# Patient Record
Sex: MALE | Race: BLACK OR AFRICAN AMERICAN | Employment: FULL TIME | URBAN - METROPOLITAN AREA
[De-identification: names, ages, dates, MRNs, and addresses within clinical notes are randomized per-mention and may not be internally consistent; named-entity substitution may affect disease eponyms.]

---

## 2022-01-18 ENCOUNTER — HOSPITAL ENCOUNTER (EMERGENCY)
Age: 32
Discharge: HOME | End: 2022-01-18
Attending: EMERGENCY MEDICINE

## 2022-01-18 ENCOUNTER — APPOINTMENT (OUTPATIENT)
Dept: CT IMAGING | Age: 32
End: 2022-01-18

## 2022-01-18 ENCOUNTER — APPOINTMENT (OUTPATIENT)
Dept: GENERAL RADIOLOGY | Age: 32
End: 2022-01-18

## 2022-01-18 VITALS
OXYGEN SATURATION: 97 % | HEIGHT: 72 IN | SYSTOLIC BLOOD PRESSURE: 136 MMHG | RESPIRATION RATE: 16 BRPM | DIASTOLIC BLOOD PRESSURE: 64 MMHG | BODY MASS INDEX: 20.86 KG/M2 | HEART RATE: 76 BPM | TEMPERATURE: 98.1 F | WEIGHT: 154 LBS

## 2022-01-18 DIAGNOSIS — U07.1 COVID-19 VIRUS INFECTION: Primary | ICD-10-CM

## 2022-01-18 LAB
A/G RATIO: 1.4 (ref 1.1–2.2)
ALBUMIN SERPL-MCNC: 4.1 G/DL (ref 3.4–5)
ALP BLD-CCNC: 64 U/L (ref 40–129)
ALT SERPL-CCNC: 12 U/L (ref 10–40)
ANION GAP SERPL CALCULATED.3IONS-SCNC: 10 MMOL/L (ref 3–16)
AST SERPL-CCNC: 22 U/L (ref 15–37)
BASOPHILS ABSOLUTE: 0 K/UL (ref 0–0.2)
BASOPHILS RELATIVE PERCENT: 0.2 %
BILIRUB SERPL-MCNC: 0.3 MG/DL (ref 0–1)
BUN BLDV-MCNC: 13 MG/DL (ref 7–20)
CALCIUM SERPL-MCNC: 8.4 MG/DL (ref 8.3–10.6)
CHLORIDE BLD-SCNC: 99 MMOL/L (ref 99–110)
CO2: 27 MMOL/L (ref 21–32)
CREAT SERPL-MCNC: 0.8 MG/DL (ref 0.9–1.3)
EOSINOPHILS ABSOLUTE: 0 K/UL (ref 0–0.6)
EOSINOPHILS RELATIVE PERCENT: 0.3 %
GFR AFRICAN AMERICAN: >60
GFR NON-AFRICAN AMERICAN: >60
GLUCOSE BLD-MCNC: 55 MG/DL (ref 70–99)
HCT VFR BLD CALC: 41.8 % (ref 40.5–52.5)
HEMOGLOBIN: 14.3 G/DL (ref 13.5–17.5)
LYMPHOCYTES ABSOLUTE: 1.9 K/UL (ref 1–5.1)
LYMPHOCYTES RELATIVE PERCENT: 33.3 %
MCH RBC QN AUTO: 29.5 PG (ref 26–34)
MCHC RBC AUTO-ENTMCNC: 34.3 G/DL (ref 31–36)
MCV RBC AUTO: 86.1 FL (ref 80–100)
MONOCYTES ABSOLUTE: 0.8 K/UL (ref 0–1.3)
MONOCYTES RELATIVE PERCENT: 13.1 %
NEUTROPHILS ABSOLUTE: 3.1 K/UL (ref 1.7–7.7)
NEUTROPHILS RELATIVE PERCENT: 53.1 %
PDW BLD-RTO: 13.1 % (ref 12.4–15.4)
PLATELET # BLD: 202 K/UL (ref 135–450)
PMV BLD AUTO: 7.7 FL (ref 5–10.5)
POTASSIUM REFLEX MAGNESIUM: 4.4 MMOL/L (ref 3.5–5.1)
RAPID INFLUENZA  B AGN: NEGATIVE
RAPID INFLUENZA A AGN: NEGATIVE
RBC # BLD: 4.86 M/UL (ref 4.2–5.9)
SARS-COV-2, NAAT: DETECTED
SODIUM BLD-SCNC: 136 MMOL/L (ref 136–145)
TOTAL PROTEIN: 7 G/DL (ref 6.4–8.2)
WBC # BLD: 5.8 K/UL (ref 4–11)

## 2022-01-18 PROCEDURE — 6360000004 HC RX CONTRAST MEDICATION: Performed by: EMERGENCY MEDICINE

## 2022-01-18 PROCEDURE — 87804 INFLUENZA ASSAY W/OPTIC: CPT

## 2022-01-18 PROCEDURE — 99284 EMERGENCY DEPT VISIT MOD MDM: CPT

## 2022-01-18 PROCEDURE — 87635 SARS-COV-2 COVID-19 AMP PRB: CPT

## 2022-01-18 PROCEDURE — 85025 COMPLETE CBC W/AUTO DIFF WBC: CPT

## 2022-01-18 PROCEDURE — 71260 CT THORAX DX C+: CPT

## 2022-01-18 PROCEDURE — 80053 COMPREHEN METABOLIC PANEL: CPT

## 2022-01-18 PROCEDURE — 71045 X-RAY EXAM CHEST 1 VIEW: CPT

## 2022-01-18 PROCEDURE — 6370000000 HC RX 637 (ALT 250 FOR IP): Performed by: EMERGENCY MEDICINE

## 2022-01-18 RX ORDER — ONDANSETRON 4 MG/1
4 TABLET, ORALLY DISINTEGRATING ORAL ONCE
Status: COMPLETED | OUTPATIENT
Start: 2022-01-18 | End: 2022-01-18

## 2022-01-18 RX ORDER — IBUPROFEN 600 MG/1
600 TABLET ORAL ONCE
Status: COMPLETED | OUTPATIENT
Start: 2022-01-18 | End: 2022-01-18

## 2022-01-18 RX ADMIN — IBUPROFEN 600 MG: 600 TABLET ORAL at 19:11

## 2022-01-18 RX ADMIN — IOPAMIDOL 75 ML: 755 INJECTION, SOLUTION INTRAVENOUS at 20:19

## 2022-01-18 RX ADMIN — ONDANSETRON 4 MG: 4 TABLET, ORALLY DISINTEGRATING ORAL at 19:11

## 2022-01-18 ASSESSMENT — PAIN SCALES - GENERAL: PAINLEVEL_OUTOF10: 0

## 2022-01-19 NOTE — ED PROVIDER NOTES
[facility]  ED  [address]  [address] [facility] [address] [phone]    As needed    [address] [facility]  [phone]  Call in 1 day        Discharge Medications:  New Prescriptions    No medications on file       FINAL IMPRESSION  1. COVID-19 virus infection        Blood pressure 136/64, pulse 76, temperature 98.1 °F (36.7 °C), temperature source Oral, resp. rate 16, height 6' (1.829 m), weight 154 lb (69.9 kg), SpO2 97 %.          Jose F Hua MD  01/18/22 6976

## 2022-01-19 NOTE — ED PROVIDER NOTES
[address] [facility]  ED  EMERGENCY DEPARTMENT ENCOUNTER      Pt Name: Rick Grossman  MRN: [id number]  Sydneygfcalvin 1990  Date of evaluation: 1/18/2022  Provider: Sandoval Cormier MD    CHIEF COMPLAINT       Chief Complaint   Patient presents with    Generalized Body Aches     hasnt felt well all weekend. reports flu like symptoms over the weekend. states he's had body aches, chills, coughing, and chest congestion     Cough    Chest Congestion         HISTORY OF PRESENT ILLNESS   (Location/Symptom, Timing/Onset, Context/Setting, Quality, Duration, Modifying Factors, Severity)  Note limiting factors. Rick Grossman is a 32 y.o. male with past medical history of no significant illness here today with cough congestion body aches. Patient states that for the last 3 to 4 days he has felt unwell. States he felt like he likely had the flu. He noted some runny nose, nasal congestion mild sore throat and nonproductive cough. Has had generalized myalgias but no known fevers. He has had slight nausea with occasional emesis and some loose stools though no overt diarrhea. No dysuria or hematuria. No known COVID-19 exposure. He states he was sleeping throughout the majority the weekend to rest but still felt unwell and thought he would come in to get evaluated. Denies hemoptysis. No lower extremity swelling, redness or pain. [facility]    Nursing Notes were reviewed. REVIEW OF SYSTEMS    (2-9 systems for level 4, 10 or more for level 5)     Review of Systems    Please see HPI for pertinent positive and negative review of system findings. A full 10 system ROS was performed and otherwise negative. PAST MEDICAL HISTORY   History reviewed. No pertinent past medical history. SURGICAL HISTORY     History reviewed. No pertinent surgical history. CURRENT MEDICATIONS       Previous Medications    No medications on file       ALLERGIES     Patient has no known allergies.     FAMILY HISTORY     History reviewed. No pertinent family history. SOCIAL HISTORY       Social History     Socioeconomic History    Marital status: Single     Spouse name: None    Number of children: None    Years of education: None    Highest education level: None   Occupational History    None   Tobacco Use    Smoking status: Current Every Day Smoker     Packs/day: 0.75     Types: Cigarettes    Smokeless tobacco: Never Used   Substance and Sexual Activity    Alcohol use: Yes     Comment: not frequently     Drug use: No    Sexual activity: None   Other Topics Concern    None   Social History Narrative    None     Social Determinants of Health     Financial Resource Strain:     Difficulty of Paying Living Expenses: Not on file   Food Insecurity:     Worried About Running Out of Food in the Last Year: Not on file    Basilio of Food in the Last Year: Not on file   Transportation Needs:     Lack of Transportation (Medical): Not on file    Lack of Transportation (Non-Medical):  Not on file   Physical Activity:     Days of Exercise per Week: Not on file    Minutes of Exercise per Session: Not on file   Stress:     Feeling of Stress : Not on file   Social Connections:     Frequency of Communication with Friends and Family: Not on file    Frequency of Social Gatherings with Friends and Family: Not on file    Attends Catholic Services: Not on file    Active Member of [address] [place] [address] or Organizations: Not on file    Attends Club or Organization Meetings: Not on file    Marital Status: Not on file   Intimate Partner Violence:     Fear of Current or Ex-Partner: Not on file    Emotionally Abused: Not on file    Physically Abused: Not on file    Sexually Abused: Not on file   Housing Stability:     Unable to Pay for Housing in the Last Year: Not on file    Number of [place] in the Last Year: Not on file    Unstable Housing in the Last Year: Not on file       SCREENINGS               PHYSICAL EXAM    (up to 7 for level 4, 8 or more for level 5)     ED Triage Vitals [01/18/22 1801]   BP Temp Temp Source Pulse Resp SpO2 Height Weight   127/72 98.1 °F (36.7 °C) Oral 79 17 99 % 6' (1.829 m) 154 lb (69.9 kg)       Physical Exam    General appearance:  Cooperative. No acute distress. Skin:  Warm. Dry. Eye:  Extraocular movements intact. Ears, nose, mouth and throat:  Oral mucosa moist,  Neck:  Trachea midline. Heart:  Regular rate and rhythm  Perfusion:  intact  Respiratory:  Lungs clear to auscultation bilaterally. Respirations nonlabored. Abdominal:   Non distended. Nontender  Neurological:  Alert and oriented x 3.   Moves all extremities spontaneously  Musculoskeletal:   Normal ROM, no deformities          Psychiatric:  Normal mood      DIAGNOSTIC RESULTS       Labs Reviewed   COVID-19, RAPID - Abnormal; Notable for the following components:       Result Value    SARS-CoV-2, NAAT DETECTED (*)     All other components within normal limits    Narrative:     Benja Jennifer tel. [phone],  Microbiology results called to and read back by CAROLINA BUNCH RN, 01/18/2022  19:51, by [facility]  Performed at:  [place] [address] [address]   Phone [phone]   COMPREHENSIVE METABOLIC PANEL W/ REFLEX TO MG FOR LOW K - Abnormal; Notable for the following components:    Glucose 55 (*)     CREATININE 0.8 (*)     All other components within normal limits    Narrative:     Performed at:  [place] [address] [address]   Phone [phone]   RAPID INFLUENZA A/B ANTIGENS    Narrative:     Performed at:  [place] [address] [address]   Phone [phone]   CBC WITH AUTO DIFFERENTIAL    Narrative:     Performed at:  [address], [facility] [address]   Phone [phone]       Interpretation per the Radiologist below, if obtained/available at the time of this note:    XR CHEST PORTABLE   Final Result   Soft tissue density in the right medial hemithorax, possibly a dilated   esophagus. Recommend CT follow-up. Otherwise unremarkable chest.  No acute pulmonary infiltrate. CT CHEST PULMONARY EMBOLISM W CONTRAST    (Results Pending)       All other labs/imaging were within normal range or not returned as of this dictation. EMERGENCY DEPARTMENT COURSE and DIFFERENTIAL DIAGNOSIS/MDM:   Vitals:    Vitals:    01/18/22 1801   BP: 127/72   Pulse: 79   Resp: 17   Temp: 98.1 °F (36.7 °C)   TempSrc: Oral   SpO2: 99%   Weight: 154 lb (69.9 kg)   Height: 6' (1.829 m)       Well-appearing male here today with a constellation of symptoms likely consistent with a viral illness. Given current pandemic suspect likely COVID-19 and t testing ultimately did confirm this. Influenza also tested and negative. Given his cough chest x-ray performed and was concerning for possible soft tissue mass. Radiology recommended CT scan which is pending at this time and will be signed out to the oncoming physician for review. Please see their note for any further follow-up recommendations or treatment. MDM    CONSULTS     None    Critical Care:   None    REASSESSMENT          PROCEDURE     Unless otherwise noted below, none     Procedures      FINAL IMPRESSION      1. COVID-19 virus infection            DISPOSITION/PLAN   DISPOSITION          PATIENT REFERRED TO:  [facility]  ED  [address] [facility] [address] [phone]    As needed      DISCHARGE MEDICATIONS:  New Prescriptions    No medications on file     Controlled Substances Monitoring:     No flowsheet data found.     (Please note that portions of this note were completed with a voice recognition program.  Efforts were made to edit the dictations but occasionally words are mis-transcribed.)    Manisha Angela MD (electronically signed)  Attending Emergency Physician            Yury Blunt MD  01/18/22 [address]  01/18/22 2620

## 2022-04-24 ENCOUNTER — HOSPITAL ENCOUNTER (EMERGENCY)
Age: 32
Discharge: HOME | End: 2022-04-24
Attending: EMERGENCY MEDICINE

## 2022-04-24 VITALS
SYSTOLIC BLOOD PRESSURE: 120 MMHG | HEART RATE: 98 BPM | RESPIRATION RATE: 16 BRPM | BODY MASS INDEX: 20.86 KG/M2 | HEIGHT: 72 IN | DIASTOLIC BLOOD PRESSURE: 80 MMHG | OXYGEN SATURATION: 98 % | TEMPERATURE: 98.6 F | WEIGHT: 154 LBS

## 2022-04-24 DIAGNOSIS — T71.162A SUICIDE ATTEMPT BY HANGING, INITIAL ENCOUNTER (HCC): Primary | ICD-10-CM

## 2022-04-24 DIAGNOSIS — F10.920 ACUTE ALCOHOLIC INTOXICATION WITHOUT COMPLICATION (HCC): ICD-10-CM

## 2022-04-24 LAB
A/G RATIO: 1.6 (ref 1.1–2.2)
ACETAMINOPHEN LEVEL: <5 UG/ML (ref 10–30)
ALBUMIN SERPL-MCNC: 4.6 G/DL (ref 3.4–5)
ALP BLD-CCNC: 84 U/L (ref 40–129)
ALT SERPL-CCNC: 9 U/L (ref 10–40)
AMPHETAMINE SCREEN, URINE: NORMAL
ANION GAP SERPL CALCULATED.3IONS-SCNC: 14 MMOL/L (ref 3–16)
AST SERPL-CCNC: 16 U/L (ref 15–37)
BARBITURATE SCREEN URINE: NORMAL
BASOPHILS ABSOLUTE: 0.1 K/UL (ref 0–0.2)
BASOPHILS RELATIVE PERCENT: 0.6 %
BENZODIAZEPINE SCREEN, URINE: NORMAL
BILIRUB SERPL-MCNC: 0.3 MG/DL (ref 0–1)
BUN BLDV-MCNC: 8 MG/DL (ref 7–20)
CALCIUM SERPL-MCNC: 9.3 MG/DL (ref 8.3–10.6)
CANNABINOID SCREEN URINE: NORMAL
CHLORIDE BLD-SCNC: 102 MMOL/L (ref 99–110)
CO2: 23 MMOL/L (ref 21–32)
COCAINE METABOLITE SCREEN URINE: NORMAL
CREAT SERPL-MCNC: 1 MG/DL (ref 0.9–1.3)
EOSINOPHILS ABSOLUTE: 0.1 K/UL (ref 0–0.6)
EOSINOPHILS RELATIVE PERCENT: 1.5 %
ETHANOL: 54 MG/DL (ref 0–0.08)
GFR AFRICAN AMERICAN: >60
GFR NON-AFRICAN AMERICAN: >60
GLUCOSE BLD-MCNC: 85 MG/DL (ref 70–99)
HCT VFR BLD CALC: 43.7 % (ref 40.5–52.5)
HEMOGLOBIN: 15.1 G/DL (ref 13.5–17.5)
INFLUENZA A: NOT DETECTED
INFLUENZA B: NOT DETECTED
LYMPHOCYTES ABSOLUTE: 2.7 K/UL (ref 1–5.1)
LYMPHOCYTES RELATIVE PERCENT: 28.1 %
Lab: NORMAL
MCH RBC QN AUTO: 29.8 PG (ref 26–34)
MCHC RBC AUTO-ENTMCNC: 34.6 G/DL (ref 31–36)
MCV RBC AUTO: 86.2 FL (ref 80–100)
METHADONE SCREEN, URINE: NORMAL
MONOCYTES ABSOLUTE: 0.9 K/UL (ref 0–1.3)
MONOCYTES RELATIVE PERCENT: 9.1 %
NEUTROPHILS ABSOLUTE: 5.8 K/UL (ref 1.7–7.7)
NEUTROPHILS RELATIVE PERCENT: 60.7 %
OPIATE SCREEN URINE: NORMAL
OXYCODONE URINE: NORMAL
PDW BLD-RTO: 13.3 % (ref 12.4–15.4)
PH UA: 7
PHENCYCLIDINE SCREEN URINE: NORMAL
PLATELET # BLD: 289 K/UL (ref 135–450)
PMV BLD AUTO: 7.3 FL (ref 5–10.5)
POTASSIUM REFLEX MAGNESIUM: 3.9 MMOL/L (ref 3.5–5.1)
PROPOXYPHENE SCREEN: NORMAL
RBC # BLD: 5.07 M/UL (ref 4.2–5.9)
SALICYLATE, SERUM: <0.3 MG/DL (ref 15–30)
SARS-COV-2 RNA, RT PCR: NOT DETECTED
SODIUM BLD-SCNC: 139 MMOL/L (ref 136–145)
TOTAL PROTEIN: 7.5 G/DL (ref 6.4–8.2)
WBC # BLD: 9.6 K/UL (ref 4–11)

## 2022-04-24 PROCEDURE — 99283 EMERGENCY DEPT VISIT LOW MDM: CPT

## 2022-04-24 PROCEDURE — 87636 SARSCOV2 & INF A&B AMP PRB: CPT

## 2022-04-24 PROCEDURE — 80143 DRUG ASSAY ACETAMINOPHEN: CPT

## 2022-04-24 PROCEDURE — 82077 ASSAY SPEC XCP UR&BREATH IA: CPT

## 2022-04-24 PROCEDURE — 36415 COLL VENOUS BLD VENIPUNCTURE: CPT

## 2022-04-24 PROCEDURE — 85025 COMPLETE CBC W/AUTO DIFF WBC: CPT

## 2022-04-24 PROCEDURE — 80307 DRUG TEST PRSMV CHEM ANLYZR: CPT

## 2022-04-24 PROCEDURE — 80053 COMPREHEN METABOLIC PANEL: CPT

## 2022-04-24 PROCEDURE — 80179 DRUG ASSAY SALICYLATE: CPT

## 2022-04-24 RX ORDER — 0.9 % SODIUM CHLORIDE 0.9 %
1000 INTRAVENOUS SOLUTION INTRAVENOUS ONCE
Status: DISCONTINUED | OUTPATIENT
Start: 2022-04-24 | End: 2022-04-24 | Stop reason: HOSPADM

## 2022-04-24 ASSESSMENT — PATIENT HEALTH QUESTIONNAIRE - PHQ9: SUM OF ALL RESPONSES TO PHQ QUESTIONS 1-9: 1

## 2022-04-24 ASSESSMENT — PAIN - FUNCTIONAL ASSESSMENT: PAIN_FUNCTIONAL_ASSESSMENT: NONE - DENIES PAIN

## 2022-04-24 ASSESSMENT — LIFESTYLE VARIABLES: HOW OFTEN DO YOU HAVE A DRINK CONTAINING ALCOHOL: NEVER

## 2022-04-24 NOTE — DISCHARGE INSTR - COC
Continuity of Care Form    Patient Name: Reji Cr   :  1990  MRN:  [id number]    Admit date:  2022  Discharge date:  ***    Code Status Order: No Order   Advance Directives:      Admitting Physician:  No admitting provider for patient encounter. PCP: No primary care provider on file. Discharging Nurse: [facility] Unit/Room#: B4/B4  Discharging Unit Phone Number: ***    Emergency Contact:   Extended Emergency Contact Information  Primary Emergency Contact: Ramón Marin, [address] Phone: [phone]  Mobile Phone: [phone]  Relation: Emergency Contact   needed? No    Past Surgical History:  History reviewed. No pertinent surgical history. Immunization History: There is no immunization history on file for this patient. Active Problems: There is no problem list on file for this patient.       Isolation/Infection:   Isolation            No Isolation          Patient Infection Status       Infection Onset Added Last Indicated Last Indicated By Review Planned Expiration Resolved Resolved By    None active    Resolved    COVID-19 (Rule Out) 22 COVID-19 & Influenza Combo (Ordered)   22 Rule-Out Test Resulted    COVID-19 22 SARS-CoV-2 NAAT (Rapid)   22     COVID-19 (Rule Out) 22 SARS-CoV-2 NAAT (Rapid) (Ordered)   22 Rule-Out Test Resulted            Nurse Assessment:  Last Vital Signs: /80   Pulse 98   Temp 98.6 °F (37 °C) (Infrared)   Resp 16   Ht 6' (1.829 m)   Wt 154 lb (69.9 kg)   SpO2 98%   BMI 20.89 kg/m²     Last documented pain score (0-10 scale):    Last Weight:   Wt Readings from Last 1 Encounters:   22 154 lb (69.9 kg)     Mental Status:  {IP PT MENTAL STATUS:61511}    IV Access:  {[facility] IV ACCESS:365019043}    Nursing Mobility/ADLs:  Walking   {[facility] DME MZVK:448596138}  Transfer  {[facility] QKBQ:706885526}  Bathing  {[facility] DME VAAF:385239330}  Dressing  {[facility] DME GGWL:980738764}  Toileting  {[facility] DME OSIF:058929713}  Feeding  {[facility] DME RGZB:500029996}  Med Admin  {[facility] DME ASL}  Med Delivery   { [facility] MED Delivery:926915335}    Wound Care Documentation and Therapy:        Elimination:  Continence: Bowel: {YES / J}  Bladder: {YES / NT:99099}  Urinary Catheter: {Urinary Catheter:651264563}   Colostomy/Ileostomy/Ileal Conduit: {YES / MM:50833}       Date of Last BM: ***  No intake or output data in the 24 hours ending 22 1037  No intake/output data recorded.     Safety Concerns:     [address] [employer] Safety Concerns:088686272}    Impairments/Disabilities:      [employer] Impairments/Disabilities:314735965}    Nutrition Therapy:  Current Nutrition Therapy:   [employer] Diet List:778900854}    Routes of Feeding: {[facility] DME Other Feedings:932274501}  Liquids: {Slp liquid thickness:08854}  Daily Fluid Restriction: {[facility] DME Yes amt example:708082264}  Last Modified Barium Swallow with Video (Video Swallowing Test): {Done Not Done VCTU:697056940}    Treatments at the Time of Hospital Discharge:   Respiratory Treatments: ***  Oxygen Therapy:  {Therapy; copd oxygen:28310}  Ventilator:    { CC Vent WTF}    Rehab Therapies: {THERAPEUTIC INTERVENTION:0574190905}  Weight Bearing Status/Restrictions: [employer]}  Other Medical Equipment (for information only, NOT a DME order):  {EQUIPMENT:183350839}  Other Treatments: ***    Patient's personal belongings (please select all that are sent with patient):  {[facility] DME Belongings:738709555}    RN SIGNATURE:  {Esignature:107279933}    CASE MANAGEMENT/SOCIAL WORK SECTION    Inpatient Status Date: ***    Readmission Risk Assessment Score:  Readmission Risk              Risk of Unplanned Readmission:  0           Discharging to Facility/ Agency   Name:   Address:  Phone:  Fax:    Dialysis Facility (if applicable)   Name:  Address:  Dialysis Schedule:  Phone:  Fax:    Case Manager/ signature: {Esignature:627443571}    PHYSICIAN SECTION    Prognosis: {Prognosis:2643274945}    Condition at Discharge: [address] Maryjane Bruno Patient Condition:989397791}    Rehab Potential (if transferring to Rehab): {Prognosis:5641426665}    Recommended Labs or Other Treatments After Discharge: ***    Physician Certification: I certify the above information and transfer of Corona Canales  is necessary for the continuing treatment of the diagnosis listed and that he requires {Admit to Appropriate Level of Care:50782} for {GREATER/LESS:133270212} 30 days.      Update Admission H&P: {[facility] DME Changes in BBGLB:351685887}    PHYSICIAN SIGNATURE:  {Esignature:238669025}

## 2022-04-24 NOTE — ED NOTES
Presenting Problem: Suicidal Ideation   Pt was brought in by police on a statement of belief due to suicidal gesture. Pt wrapped a rope around his neck that was hung over the rafters in his garage. Stated he was sitting on a speaker box with the rope around his neck while drinking Reginia Beulah. He then sent a video message to his girlfriend in [place], [place], who called the police. He also text his other girlfriend, Denise Brennan, to come home and that he needed help. Pt does not have any visible ligature marks on his neck. Pt was clinically sober at the time of evaluation. Appearance/Hygiene:  well-appearing   Motor Behavior: WNL  Attitude: cooperative  Affect: normal affect   Speech: normal pitch and normal volume  Mood: sad   Thought Processes: Goal directed and Logical  Perceptions: Absent   Thought content:  future oriented, feels remorse for his actions last night  Orientation: A&Ox4   Memory: intact  Concentration: Good    Insight/ judgement: normal insight and judgment at the time of assessment      Psychosocial and contextual factors: Pt works full time at an auto parts store. Pt is in a polyamorous relationship with two women, who all live together. One of the women, [place], left him and moved to [place] three weeks ago. This has caused emotional turmoil. Stated he misses her and wants her to come back. Stated he is not suicidal but acknowledges he made a very poor impulsive decision last night. \" That was just not like me\". Denies having any difficulties with functioning at work, completing ADL's, appetite, sleep, or mood instability. \" I am not depressed. What I'm going through now is more of a situational depression\". C-SSRS flowsheet is complete. Denies all hx of suicide attempts or gestures prior to last night. Stated last night was \"spur of the moment thought\" and is completely out of character.  \" I have always been the andrew telling people there is nothing in this world that is worth taking your live for\". \" I can assure you that this will absolutely never happen again\". Denies suicidal ideation, plan, or intent. Psychiatric History (including current outpatient provider and past inpatient admissions): Denies all psychiatric history. Access to Firearms: Denies    ASSESSMENT FOR IMMINENT FUTURE DANGER:    RISK FACTORS:    []  Age <25 or >49   [x]  Male gender   []  Depressed mood   []  Active suicidal ideation   []  Suicide plan   [x]  Suicide attempt-made gesture last night.    []  Access to lethal means   []  Prior suicide attempt   []  Active substance abuse    [x]  Highly impulsive behaviors-last night   []  Not attending to self-care/ADLs    [x]  Recent significant loss-break up with gf   []  Chronic pain or medical illness   []  Social isolation   []  History of violence    []  Active psychosis   []  Cognitive impairment    [x]  No outpatient services in place   []  Medication noncompliance   []  No collateral information to support safety  [] Self- injurious/ Self-harm behavior    PROTECTIVE FACTORS:  [x] Age >25 and <55  [] Female gender   [x] Denies depression  [x] Denies suicidal ideation  [x] Does not have lethal plan   [x] Does not have access to guns or weapons  [x] Patient is verbally margoth for safety  [x] No prior suicide attempts  [x] No active substance abuse  [x] Patient has social or family support  [x] No active psychosis or cognitive dysfunction  [x] Physically healthy  [] Has outpatient services in place  [] Compliant with recommended medications  [x] Collateral information from supports patient safety   [x] Patient is future oriented with plans to get connected with a relationship counselor. Reports he likes to work on cars as a hobby. Goal oriented as he plans to work on his relationship with Peggy Urbano.      Verbal permission received from pt to speak to his girlfriend, Peggy Urbano ([phone]), for collateral. Peggy Urbano stated they are in a polyamorous relationship and the other girlfriend, Rosa Hernandez, left and moved to [place] three weeks ago. Stated she does not feel patient is suicidal. \" This was a bad attempt for him to try to get Rosa Hernandez to come back home. He doesn't really want to kill himself. He loves life and spreads caryn and it's not to cover anything up\". Evan Walsh stated she feels safe if patient is to be discharged. \" I work from home so I will be able to see him at all times. Plus his friend at work, Karma, is aware of what happened and he will keep an eye on him at [facility]". Evan Walsh stated she and Karma are very supportive of him. Level of Care Disposition: Discharge    Patient was seen by ED provider and [facility] AN AFFILIATE OF [facility] staff. The case was presented to psychiatric provider on-call, Dr Kellie Duggan. Based on the ED evaluation and information presented to the provider by this nurse, it is the recommendation of the on call psychiatric provider that the patient be discharged from a psychiatric standpoint with the following referrals: Crisis line #'s, outpatient mental health referrals.           Ty Aguilar, RN  04/24/22 4356

## 2022-04-24 NOTE — ED NOTES
Patient states that he does not have any SI at this point. Pt states that it was an emotional decision that he regrets.       Concepcion Maria RN  04/24/22 5485

## 2022-04-24 NOTE — ED PROVIDER NOTES
Emergency Physician Note  [address] ED  [address] [facility] [place]. [address]  Dept: [phone]  Loc: [phone]  Open Note Time:  3:09 AM EDT    Chief Complaint  Psychiatric Evaluation (On hold. Pt tried to hang himself due to depression. Pt states that he was hung from the rafters however he then backed up and stated that he did not. Alcohol on board. )       History of Present Illness  Chapincito Jacobs is a 32 y.o. male  has no past medical history on file. who presents to the ED for  psychiatric evaluation. Patient does admit to alcohol use tonight. He had gotten into a disagreement with his girlfriend and they broke up. Patient states at 1 point he did have a sheet wrapped around his neck and he had the sheet over after he states that when he relaxed he had some pressure on his neck for approximately 20 minutes. He states that he never actually dropped down and had himself hanging by the sheet around his neck. At this time he does not complain of any neck pain, does not complain of any difficulty speaking, does not complain of a sore throat. At this time he currently does not have any suicidal ideation. He has not attempted suicide in the past.    Denies fever,  chest pain, shortness of breath, cough, abdominal pain, nausea, vomiting, diarrhea, headache, sore throat,  neck pain . No palliative/provocative factors.        Unless otherwise stated in this report or unable to obtain because of the patient's clinical or mental status as evidenced by the medical record, this patient's positive and negative responses for review of systems, constitutional, psych, eyes, ENT, cardiovascular, respiratory, gastrointestinal, neurological, genitourinary, musculoskeletal, integument systems and systems related to the presenting problem are either stated in the preceding paragraph or were not pertinent or were negative for the symptoms and/or complaints related to the medical problem. I have reviewed the following from the nursing documentation:      Prior to Admission medications    Not on File       Allergies as of 04/24/2022    (No Known Allergies)       History reviewed. No pertinent past medical history. Surgical History: History reviewed. No pertinent surgical history. Family History:  History reviewed. No pertinent family history. Social History     Socioeconomic History    Marital status: Single     Spouse name: Not on file    Number of children: Not on file    Years of education: Not on file    Highest education level: Not on file   Occupational History    Not on file   Tobacco Use    Smoking status: Current Every Day Smoker     Packs/day: 0.75     Types: Cigarettes    Smokeless tobacco: Never Used   Substance and Sexual Activity    Alcohol use: Yes     Comment: bottle today    Drug use: No    Sexual activity: Not on file   Other Topics Concern    Not on file   Social History Narrative    Not on file     Social Determinants of Health     Financial Resource Strain:     Difficulty of Paying Living Expenses: Not on file   Food Insecurity:     Worried About Running Out of Food in the Last Year: Not on file    Basilio of Food in the Last Year: Not on file   Transportation Needs:     Lack of Transportation (Medical): Not on file    Lack of Transportation (Non-Medical):  Not on file   Physical Activity:     Days of Exercise per Week: Not on file    Minutes of Exercise per Session: Not on file   Stress:     Feeling of Stress : Not on file   Social Connections:     Frequency of Communication with Friends and Family: Not on file    Frequency of Social Gatherings with Friends and Family: Not on file    Attends Zoroastrian Services: Not on file    Active Member of Clubs or Organizations: Not on file    Attends Club or Organization Meetings: Not on file    Marital Status: Not on file   Intimate Partner Violence:     Fear of Current or Ex-Partner: Not on file    Emotionally Abused: Not on file    Physically Abused: Not on file    Sexually Abused: Not on file   Housing Stability:     Unable to Pay for Housing in the Last Year: Not on file    Number of Places Lived in the Last Year: Not on file    Unstable Housing in the Last Year: Not on file       Nursing notes reviewed. ED Triage Vitals [04/24/22 0133]   Enc Vitals Group      BP (!) 137/100      Pulse 66      Resp 15      Temp 97.6 °F (36.4 °C)      Temp src       SpO2 99 %      Weight 154 lb (69.9 kg)      Height 6' (1.829 m)      Head Circumference       Peak Flow       Pain Score       Pain Loc       Pain Edu? Excl. in [address]? GENERAL:   Body mass index is 20.89 kg/m². Sleeping yet easily arousable and subsequently awake, alert. Well developed, well nourished with no apparent distress. Nontoxic-appearing, non-ill-appearing. HENT:   Normocephalic, Atraumatic, no lacerations. No trismus  Oropharynx clear, no tonsilar inflammation, no tonsilar exudates,  no airway obstruction, moist mucous membranes. Uvula was midline and has symmetric rise. EYES:   Conjunctiva normal,   Pupils equal round and reactive to light,   Extraocular movements normal.  NECK:  Trachea is midline. No stridor. On the anterior portion of his neck there does appear to be an area of dry skin with a little bit of skin flaking, there are multiple tattoos but there does not seem to be any obvious erythema or skin excoriation  No lymphadenopathy of the anterior cervical chain  No lymphadenopathy of the posterior cervical chain. No meningeal signs, Supple without JVD. No C-spine midline tenderness. CHEST:  Regular rate and regular rhythm, no murmurs/rubs/gallops,  normal S1/S2, chest wall non-tender. LUNGS:  No respiratory distress.   No abdominal retractions, no sternal retractions  Clear to auscultation bilaterally, no wheezing, no rhochi, no rales  Speaking comfortably in full sentences  ABDOMEN:  Soft, non-tender, non-distended. No guarding. No rebound. EXTREMITIES:  Moves extremities x4 with purpose. Normal range of motion, no edema,  No tenderness, no deformity,  distal pulses present and equal bilaterally. BACK:  No midline tenderness in the cervical, thoracic, and lumbar spine. No deformities, no step-off. Palpation did not elicit any paraspinous tenderness. SKIN:  Warm, dry and intact. NEUROLOGIC:  Normal mental status. Moving all extremities to command. Alert and oriented x4  without focal motor deficit or gross sensory deficit. Normal speech. Strength 5/5 in bilateral upper and lower extremities. Sensation is intact in the upper and lower extremities. PSYCHIATRIC:  Not anxious,  Depressed mood with congruent affect,  Appropriate eye contact,  thoughts are linear and organized,  without delusions/hallucinations,  Not responding to internal stimuli,  responds appropriately to questions  Denies SI/HI    LABS and DIAGNOSTIC RESULTS      RADIOLOGY  X-RAYS:  I have reviewed radiologic plain film image(s). ALL OTHER NON-PLAIN FILM IMAGES SUCH AS CT, ULTRASOUND AND MRI HAVE BEEN READ BY THE RADIOLOGIST.   No orders to display          LABS  Results for orders placed or performed during the hospital encounter of 04/24/22   COVID-19 & Influenza Combo    Specimen: Nasopharyngeal Swab   Result Value Ref Range    SARS-CoV-2 RNA, RT PCR NOT DETECTED NOT DETECTED    INFLUENZA A NOT DETECTED NOT DETECTED    INFLUENZA B NOT DETECTED NOT DETECTED   Drug screen multi urine   Result Value Ref Range    Amphetamine Screen, Urine Neg Negative <1000ng/mL    Barbiturate Screen, Ur Neg Negative <200 ng/mL    Benzodiazepine Screen, Urine Neg Negative <200 ng/mL    Cannabinoid Scrn, Ur Neg Negative <50 ng/mL    Cocaine Metabolite Screen, Urine Neg Negative <300 ng/mL    Opiate Scrn, Ur Neg Negative <300 ng/mL    PCP Screen, Urine Neg Negative <25 ng/mL    Methadone Screen, Urine Neg Negative <300 ng/mL    Propoxyphene Scrn, Ur Neg Negative <300 ng/mL    Oxycodone Urine Neg Negative <100 ng/ml    pH, UA 7.0     Drug Screen Comment: see below    Salicylate   Result Value Ref Range    Salicylate, Serum <4.5 (L) 15.0 - 30.0 mg/dL   Acetaminophen Level   Result Value Ref Range    Acetaminophen Level <5 (L) 10 - 30 ug/mL   Ethanol   Result Value Ref Range    Ethanol Lvl 54 mg/dL   CBC with Auto Differential   Result Value Ref Range    WBC 9.6 4.0 - 11.0 K/uL    RBC 5.07 4.20 - 5.90 M/uL    Hemoglobin 15.1 13.5 - 17.5 g/dL    Hematocrit 43.7 40.5 - 52.5 %    MCV 86.2 80.0 - 100.0 fL    MCH 29.8 26.0 - 34.0 pg    MCHC 34.6 31.0 - 36.0 g/dL    RDW 13.3 12.4 - 15.4 %    Platelets 736 811 - 741 K/uL    MPV 7.3 5.0 - 10.5 fL    Neutrophils % 60.7 %    Lymphocytes % 28.1 %    Monocytes % 9.1 %    Eosinophils % 1.5 %    Basophils % 0.6 %    Neutrophils Absolute 5.8 1.7 - 7.7 K/uL    Lymphocytes Absolute 2.7 1.0 - 5.1 K/uL    Monocytes Absolute 0.9 0.0 - 1.3 K/uL    Eosinophils Absolute 0.1 0.0 - 0.6 K/uL    Basophils Absolute 0.1 0.0 - 0.2 K/uL   Comprehensive Metabolic Panel w/ Reflex to MG   Result Value Ref Range    Sodium 139 136 - 145 mmol/L    Potassium reflex Magnesium 3.9 3.5 - 5.1 mmol/L    Chloride 102 99 - 110 mmol/L    CO2 23 21 - 32 mmol/L    Anion Gap 14 3 - 16    Glucose 85 70 - 99 mg/dL    BUN 8 7 - 20 mg/dL    CREATININE 1.0 0.9 - 1.3 mg/dL    GFR Non-African American >60 >60    GFR African American >60 >60    Calcium 9.3 8.3 - 10.6 mg/dL    Total Protein 7.5 6.4 - 8.2 g/dL    Albumin 4.6 3.4 - 5.0 g/dL    Albumin/Globulin Ratio 1.6 1.1 - 2.2    Total Bilirubin 0.3 0.0 - 1.0 mg/dL    Alkaline Phosphatase 84 40 - 129 U/L    ALT 9 (L) 10 - 40 U/L    AST 16 15 - 37 U/L       SCREENINGS  NIH Score     [facility]  [place] Coma Scale  Eye Opening: Spontaneous  Best Verbal Response: Oriented  Best Motor Response: Obeys commands  [place] Coma Scale Score: 15  [facility]    Heart Score PROCEDURES    MEDICAL DECISION MAKING    Procedures/interventions/images ordered for this visit  Orders Placed This Encounter   Procedures    COVID-19 & Influenza Combo    Drug screen multi urine    Salicylate    Acetaminophen Level    Ethanol    CBC with Auto Differential    Comprehensive Metabolic Panel w/ Reflex to MG       Medications ordered for this visit  Orders Placed This Encounter   Medications    0.9 % sodium chloride bolus       ED course notes for this visit       I evaluated the patient in room 09/09    Differential diagnoses: Drug or alcohol use or abuse, psychosis, behavioral mental illness, metabolic disturbance, infection, neurologic emergency, other    This patient presented to the ED at risk of harming themselves or others, meeting criteria to be placed on an involuntary hold. Involuntary form was completed and pt was informed that they are being placed on this hold. Pt  does not have a history of psychological problems. They are nontoxic appearing. Vital signs were reviewed and addressed. They were examined for acute injuries and illness. No gross evidence of self harm. Appropriate tests were ordered in the ED to medically clear them for transfer and/or evaluation by a behavioral health unit. There is no significant evidence of underlying medical etiology for the pts current psychological issue, such as toxidrome, CVA, SAH, cerebral tumor, acute coronary syndrome, toxicity, shock, sepsis, electrolyte imbalance, thyroid irregularity, or intoxication needing inpatient detox. I have performed a medical clearance examination on this patient. It is my opinion that no medical conditions were discovered that would preclude admission to a behavioral health unit or discharge home. I feel that the patient is medically stable for disposition by the behavioral health team at this time. Final Impression    1. Suicide attempt by hanging, initial encounter ([facility] [address]    2.  Acute alcoholic intoxication without complication (HCC)        Blood pressure (!) 137/100, pulse 66, temperature 97.6 °F (36.4 °C), resp. rate 15, height 6' (1.829 m), weight 154 lb (69.9 kg), SpO2 99 %. Disposition  Pt is in stable condition upon Transfer to [facility] - medically cleared for psychiatric evaluation. Pt was seen during the [place] 19 pandemic. Appropriate PPE worn by this writer during patient encounters. Pt seen during a time with constrained hospital bed capacity and other potential inpatient and outpatient resources were constrained due to the viral pandemic. The note was completed using Dragon voice recognition transcription. Every effort was made to ensure accuracy; however, inadvertent transcription errors may be present despite my best efforts to edit errors.     Lis Cordero MD  [address] MD  04/24/22 [id number]

## 2022-04-24 NOTE — ED NOTES
Pt brought back to [facility] already changed into safety gown. Pt oriented to room B4 and [facility] process. Pt's belongings placed in locker. Pt calm and cooperative. No signs of distress noted.          Alec OSULLIVAN